# Patient Record
Sex: FEMALE | Race: BLACK OR AFRICAN AMERICAN | Employment: FULL TIME
[De-identification: names, ages, dates, MRNs, and addresses within clinical notes are randomized per-mention and may not be internally consistent; named-entity substitution may affect disease eponyms.]

---

## 2021-09-12 ENCOUNTER — NURSE TRIAGE (OUTPATIENT)
Dept: OTHER | Facility: CLINIC | Age: 61
End: 2021-09-12

## 2021-09-12 NOTE — TELEPHONE ENCOUNTER
Reason for Disposition   Followed a toe injury   [1] Toe injury AND [2] bad limp or can't wear shoes/sandals    Answer Assessment - Initial Assessment Questions  1. ONSET: \"When did the pain start? \"       \"Some days ago\" - hit on edge of coffee table - no blood at time of injury    2. LOCATION: \"Where is the pain located? \"   (e.g., around nail, entire toe, at foot joint)       Left small toe    3. PAIN: \"How bad is the pain? \"    (Scale 1-10; or mild, moderate, severe)    -  MILD (1-3): doesn't interfere with normal activities     -  MODERATE (4-7): interferes with normal activities (e.g., work or school) or awakens from sleep, limping     -  SEVERE (8-10): excruciating pain, unable to do any normal activities, unable to walk      Sitting still 0/10 , walking 8/10 - she has not taken pain medication for it. 4. APPEARANCE: \"What does the toe look like? \" (e.g., redness, swelling, bruising, pallor)      Mostly a \"knot\"on the top and swollen. No redness or streaks. She states it is \"really fat\" but not crooked. 5. CAUSE: \"What do you think is causing the toe pain? \"      See above    6. OTHER SYMPTOMS: \"Do you have any other symptoms? \" (e.g., leg pain, rash, fever, numbness)      When she bends her toes , the injured toe \"feels weird\"    7. PREGNANCY: \"Is there any chance you are pregnant? \" \"When was your last menstrual period? \"      no    Protocols used: TOE PAIN-ADULT-, TOE INJURY-ADULT-    Brief description of triage: see above    Triage indicates for patient to be seen within 24 hours - pt will go to  today. [facility] located in her area that are covered under her insurance via [employer] website. Care advice provided, patient verbalizes understanding; denies any other questions or concerns; instructed to call back for any new or worsening symptoms. This triage is a result of a call to [address] [place] [address]. Please do not respond to the triage nurse through this encounter.  Any subsequent

## 2021-12-16 PROBLEM — D05.12 DUCTAL CARCINOMA IN SITU OF LEFT BREAST: Status: ACTIVE | Noted: 2021-12-16

## 2022-02-01 PROBLEM — Z90.12 S/P MASTECTOMY, LEFT: Status: ACTIVE | Noted: 2022-02-01

## 2022-02-07 PROBLEM — C50.512 MALIGNANT NEOPLASM OF LOWER-OUTER QUADRANT OF LEFT BREAST OF FEMALE, ESTROGEN RECEPTOR NEGATIVE (HCC): Status: ACTIVE | Noted: 2021-12-16

## 2022-02-07 PROBLEM — Z17.1 MALIGNANT NEOPLASM OF LOWER-OUTER QUADRANT OF LEFT BREAST OF FEMALE, ESTROGEN RECEPTOR NEGATIVE (HCC): Status: ACTIVE | Noted: 2021-12-16

## 2022-06-02 PROBLEM — Z90.10 ACQUIRED ABSENCE OF UNSPECIFIED BREAST AND NIPPLE: Status: ACTIVE | Noted: 2022-02-28

## 2022-06-02 PROBLEM — K21.9 GASTROESOPHAGEAL REFLUX DISEASE: Status: ACTIVE | Noted: 2022-06-02

## 2022-06-02 PROBLEM — R05.3 CHRONIC COUGH: Status: ACTIVE | Noted: 2022-06-02

## 2022-06-02 PROBLEM — R40.0 DAYTIME SOMNOLENCE: Status: ACTIVE | Noted: 2022-06-02

## 2022-06-02 PROBLEM — H53.9 VISUAL DISTURBANCE: Status: ACTIVE | Noted: 2022-06-02

## 2022-06-02 PROBLEM — I10 ESSENTIAL HYPERTENSION: Status: ACTIVE | Noted: 2022-02-28

## 2022-06-02 PROBLEM — H34.9 RETINAL ARTERY OCCLUSION: Status: ACTIVE | Noted: 2022-06-02

## 2022-06-02 PROBLEM — J30.81 ALLERGIC RHINITIS DUE TO ANIMAL HAIR AND DANDER: Status: ACTIVE | Noted: 2022-06-02

## 2022-06-02 PROBLEM — M81.0 AGE RELATED OSTEOPOROSIS: Status: ACTIVE | Noted: 2017-04-10

## 2022-06-02 PROBLEM — K21.9 LARYNGOPHARYNGEAL REFLUX: Status: ACTIVE | Noted: 2022-06-02

## 2022-06-02 PROBLEM — S02.5XXA FRACTURE OF TOOTH (TRAUMATIC), INITIAL ENCOUNTER FOR CLOSED FRACTURE: Status: ACTIVE | Noted: 2022-02-28

## 2022-06-02 PROBLEM — M17.0 PRIMARY OSTEOARTHRITIS OF BOTH KNEES: Status: ACTIVE | Noted: 2017-04-10

## 2022-06-02 PROBLEM — D05.10 DUCTAL CARCINOMA IN SITU (DCIS) OF BREAST: Status: ACTIVE | Noted: 2022-01-07

## 2022-06-02 PROBLEM — J30.1 ALLERGIC RHINITIS DUE TO POLLEN: Status: ACTIVE | Noted: 2022-06-02

## 2022-06-02 PROBLEM — R42 DIZZINESS: Status: ACTIVE | Noted: 2022-06-02

## 2022-06-02 PROBLEM — G43.019 REFRACTORY MIGRAINE WITHOUT AURA: Status: ACTIVE | Noted: 2022-06-02

## 2022-06-02 PROBLEM — J30.9 ALLERGIC RHINITIS: Status: ACTIVE | Noted: 2022-06-02

## 2022-07-27 PROBLEM — I87.8 POOR VENOUS ACCESS: Status: ACTIVE | Noted: 2022-07-27

## 2022-07-27 PROBLEM — T82.898A CLOTTED VASCULAR CATHETER (HCC): Status: ACTIVE | Noted: 2022-07-27

## 2024-09-10 ENCOUNTER — APPOINTMENT (OUTPATIENT)
Dept: OTOLARYNGOLOGY | Facility: CLINIC | Age: 64
End: 2024-09-10
Payer: COMMERCIAL